# Patient Record
Sex: FEMALE | Race: WHITE | NOT HISPANIC OR LATINO | Employment: OTHER | URBAN - METROPOLITAN AREA
[De-identification: names, ages, dates, MRNs, and addresses within clinical notes are randomized per-mention and may not be internally consistent; named-entity substitution may affect disease eponyms.]

---

## 2018-08-30 ENCOUNTER — OFFICE VISIT (OUTPATIENT)
Dept: INTERNAL MEDICINE | Facility: CLINIC | Age: 83
End: 2018-08-30
Payer: MEDICARE

## 2018-08-30 VITALS
HEART RATE: 83 BPM | SYSTOLIC BLOOD PRESSURE: 120 MMHG | WEIGHT: 214.94 LBS | RESPIRATION RATE: 16 BRPM | OXYGEN SATURATION: 93 % | TEMPERATURE: 101 F | DIASTOLIC BLOOD PRESSURE: 60 MMHG

## 2018-08-30 DIAGNOSIS — N30.00 ACUTE CYSTITIS WITHOUT HEMATURIA: Primary | ICD-10-CM

## 2018-08-30 LAB
BILIRUB SERPL-MCNC: NEGATIVE MG/DL
BLOOD, POC UA: 50
GLUCOSE UR QL STRIP: NORMAL
KETONES UR QL STRIP: NEGATIVE
LEUKOCYTE ESTERASE URINE, POC: 1
NITRITE, POC UA: POSITIVE
PH, POC UA: 7
PROTEIN, POC: 30
SPECIFIC GRAVITY, POC UA: 1
UROBILINOGEN, POC UA: NORMAL

## 2018-08-30 PROCEDURE — 99204 OFFICE O/P NEW MOD 45 MIN: CPT | Mod: PBBFAC,PO | Performed by: NURSE PRACTITIONER

## 2018-08-30 PROCEDURE — 99202 OFFICE O/P NEW SF 15 MIN: CPT | Mod: S$PBB,,, | Performed by: NURSE PRACTITIONER

## 2018-08-30 PROCEDURE — 81000 URINALYSIS NONAUTO W/SCOPE: CPT | Mod: PBBFAC,PO | Performed by: NURSE PRACTITIONER

## 2018-08-30 PROCEDURE — 99999 PR PBB SHADOW E&M-NEW PATIENT-LVL IV: CPT | Mod: PBBFAC,,, | Performed by: NURSE PRACTITIONER

## 2018-08-30 RX ORDER — SULFAMETHOXAZOLE AND TRIMETHOPRIM 800; 160 MG/1; MG/1
1 TABLET ORAL 2 TIMES DAILY
Qty: 14 TABLET | Refills: 0 | Status: SHIPPED | OUTPATIENT
Start: 2018-08-30 | End: 2018-09-06

## 2018-08-30 RX ORDER — LOVASTATIN 20 MG/1
1 TABLET ORAL
COMMUNITY
Start: 2018-08-13

## 2018-08-30 RX ORDER — AMLODIPINE BESYLATE 10 MG/1
TABLET ORAL
COMMUNITY
Start: 2018-08-04

## 2018-08-30 RX ORDER — INDOMETHACIN 25 MG/1
CAPSULE ORAL
COMMUNITY
Start: 2018-08-27

## 2018-08-30 RX ORDER — METOPROLOL TARTRATE 50 MG/1
TABLET ORAL
COMMUNITY
Start: 2018-07-23

## 2018-08-30 RX ORDER — LISINOPRIL 20 MG/1
TABLET ORAL
COMMUNITY
Start: 2018-08-22

## 2018-08-30 NOTE — PATIENT INSTRUCTIONS
Understanding Urinary Tract Infections (UTIs)  Most UTIs are caused by bacteria, although they may also be caused by viruses or fungi. Bacteria from the bowel are the most common source of infection. The infection may start because of any of the following:  · Sexual activity. During sex, bacteria can travel from the penis, vagina, or rectum into the urethra.   · Bacteria on the skin outside the rectum may travel into the urethra. This is more common in women since the rectum and urethra are closer to each other than in men. Wiping from front to back after using the toilet and keeping the area clean can help prevent germs from getting to the urethra.  · Blockage of urine flow through the urinary tract. If urine sits too long, germs may start to grow out of control.      Parts of the urinary tract  The infection can occur in any part of the urinary tract.  · The kidneys collect and store urine.  · The ureters carry urine from the kidneys to the bladder.  · The bladder holds urine until you are ready to let it out.  · The urethra carries urine from the bladder out of the body. It is shorter in women, so bacteria can move through it more easily. The urethra is longer in men, so a UTI is less likely to reach the bladder or kidneys in men.  Date Last Reviewed: 1/1/2017  © 1535-3530 [employer]. [address] [place] [address], [place] [address]. All rights reserved. This information is not intended as a substitute for professional medical care. Always follow your healthcare professional's instructions.

## 2018-09-07 PROBLEM — N30.00 ACUTE CYSTITIS WITHOUT HEMATURIA: Status: ACTIVE | Noted: 2018-09-07

## 2018-09-07 NOTE — PROGRESS NOTES
Subjective:       Patient ID: Mora Rodas is a 83 y.o. female.    Chief Complaint: Fever; Urinary Tract Infection; and Fatigue    Past Medical History:   Diagnosis Date    Gout     Hypertension      Past Surgical History:   Procedure Laterality Date     SECTION      HYSTERECTOMY      KNEE SURGERY       Social History     Socioeconomic History    Marital status:      Spouse name: None    Number of children: None    Years of education: None    Highest education level: None   Social Needs    Financial resource strain: None    Food insecurity - worry: None    Food insecurity - inability: None    Transportation needs - medical: None    Transportation needs - non-medical: None   Occupational History    None   Tobacco Use    Smoking status: Never Smoker    Smokeless tobacco: Never Used   Substance and Sexual Activity    Alcohol use: No     Frequency: Never    Drug use: No    Sexual activity: No   Other Topics Concern    None   Social History Narrative    None   History reviewed. No pertinent family history.   Current Outpatient Medications on File Prior to Visit   Medication Sig    amLODIPine (NORVASC) 10 MG tablet     indomethacin (INDOCIN) 25 MG capsule     lisinopril (PRINIVIL,ZESTRIL) 20 MG tablet     lovastatin (MEVACOR) 20 MG tablet Take 1 tablet by mouth daily with lunch.    metoprolol tartrate (LOPRESSOR) 50 MG tablet      No current facility-administered medications on file prior to visit.      Urinary Tract Infection    This is a new problem. The current episode started in the past 7 days. The problem has been unchanged. The quality of the pain is described as aching and burning. The pain is moderate. The maximum temperature recorded prior to her arrival was 101 - 101.9 F. The fever has been present for 1 - 2 days. She is not sexually active. There is no history of pyelonephritis. Associated symptoms include behavior changes, chills, frequency, hesitancy, sweats and  urgency. Pertinent negatives include no discharge, flank pain, hematuria, nausea, vomiting, constipation, rash or withholding. She has tried increased fluids for the symptoms. The treatment provided no relief.     Review of Systems   Constitutional: Positive for chills. Negative for activity change, appetite change, fatigue and fever.   HENT: Negative for congestion, ear pain, hearing loss, postnasal drip, rhinorrhea, sinus pressure, sore throat and trouble swallowing.    Eyes: Negative for pain and visual disturbance.   Respiratory: Negative for cough, shortness of breath and wheezing.    Cardiovascular: Negative for chest pain and palpitations.   Gastrointestinal: Negative for abdominal pain, constipation, nausea and vomiting.   Endocrine: Negative for polydipsia, polyphagia and polyuria.   Genitourinary: Positive for frequency, hesitancy and urgency. Negative for dysuria, flank pain, hematuria and vaginal discharge.   Musculoskeletal: Negative for arthralgias, myalgias and neck stiffness.   Skin: Negative for rash and wound.   Neurological: Negative for dizziness, syncope, weakness and headaches.   Hematological: Negative for adenopathy.   Psychiatric/Behavioral: Positive for confusion. Negative for agitation, dysphoric mood and suicidal ideas. The patient is not nervous/anxious.        Objective:      Physical Exam   Constitutional: She is oriented to person, place, and time. She appears well-developed. No distress.   Eyes: Pupils are equal, round, and reactive to light.   Cardiovascular: Normal rate and regular rhythm.   Pulmonary/Chest: Effort normal and breath sounds normal. No respiratory distress. She has no wheezes.   Abdominal: Normal appearance and bowel sounds are normal. There is tenderness in the suprapubic area.   Neurological: She is alert and oriented to person, place, and time.   Skin: Skin is warm and dry. No rash noted. She is not diaphoretic.   Psychiatric: She has a normal mood and affect. Her  behavior is normal.   Nursing note and vitals reviewed.      Assessment:       1. Acute cystitis without hematuria        Plan:     Problem List Items Addressed This Visit        Renal/    Acute cystitis without hematuria - Primary    Current Assessment & Plan      Push fluids  Rtc not improved  over the next 48 hrs         Relevant Orders    POCT URINALYSIS (Completed)        No Follow-up on file.